# Patient Record
Sex: MALE
[De-identification: names, ages, dates, MRNs, and addresses within clinical notes are randomized per-mention and may not be internally consistent; named-entity substitution may affect disease eponyms.]

---

## 2019-03-23 ENCOUNTER — HOSPITAL ENCOUNTER (INPATIENT)
Dept: HOSPITAL 14 - H.ER | Age: 54
LOS: 9 days | Discharge: HOME | DRG: 430 | End: 2019-04-01
Attending: PSYCHIATRY & NEUROLOGY | Admitting: PSYCHIATRY & NEUROLOGY
Payer: COMMERCIAL

## 2019-03-23 VITALS — BODY MASS INDEX: 29.5 KG/M2

## 2019-03-23 VITALS — OXYGEN SATURATION: 95 %

## 2019-03-23 DIAGNOSIS — J44.9: ICD-10-CM

## 2019-03-23 DIAGNOSIS — F17.210: ICD-10-CM

## 2019-03-23 DIAGNOSIS — Z91.19: ICD-10-CM

## 2019-03-23 DIAGNOSIS — Z59.0: ICD-10-CM

## 2019-03-23 DIAGNOSIS — R45.851: ICD-10-CM

## 2019-03-23 DIAGNOSIS — Z91.14: ICD-10-CM

## 2019-03-23 DIAGNOSIS — G43.909: ICD-10-CM

## 2019-03-23 DIAGNOSIS — F31.63: Primary | ICD-10-CM

## 2019-03-23 DIAGNOSIS — I10: ICD-10-CM

## 2019-03-23 PROCEDURE — GZ56ZZZ INDIVIDUAL PSYCHOTHERAPY, SUPPORTIVE: ICD-10-PCS | Performed by: PSYCHIATRY & NEUROLOGY

## 2019-03-23 PROCEDURE — GZ58ZZZ INDIVIDUAL PSYCHOTHERAPY, COGNITIVE-BEHAVIORAL: ICD-10-PCS | Performed by: PSYCHIATRY & NEUROLOGY

## 2019-03-23 PROCEDURE — GZHZZZZ GROUP PSYCHOTHERAPY: ICD-10-PCS | Performed by: PSYCHIATRY & NEUROLOGY

## 2019-03-23 RX ADMIN — Medication SCH TAB: at 21:05

## 2019-03-23 SDOH — ECONOMIC STABILITY - HOUSING INSECURITY: HOMELESSNESS: Z59.0

## 2019-03-23 NOTE — PCM.PSYCH
Initial Psychiatric Evaluation





- Initial Psychiatric Evaluation


Chief Complaint (in patient's own words): 





was discharged from hospital living in [place] returned to living with friend 

intermittently in hotel room somewhat homeless became depressed having thought 

of harming self 


Patient's Reaction to Hospitalization: 





signed in voluntarily


History of Present Illness and Precipitating Events: 





feeling depressed suicidal thoughts without plan had presented to [facility] er, was recently discharged from [facility], reports hx 

of depression, non adherence with medication, intermittent homelessness, non 

adherence with treatment


Current Medications: 





Active Medications











Generic Name Dose Route Start Last Admin





  Trade Name Freq  PRN Reason Stop Dose Admin


 


Acetaminophen  650 mg  03/23/19 10:21  





  Tylenol 325mg Tab  PO   





  Q4 PRN   





  Pain (4-7)   





     





     





     


 


Al Hydrox/Mg Hydrox/Simethicone  30 ml  03/23/19 10:21  





  Maalox Plus 30 Ml  PO   





  Q4 PRN   





  Dyspepsia   





     





     





     


 


Diphenhydramine HCl  50 mg  03/23/19 10:21  





  Benadryl  IM   





  Q6 PRN   





  Extrapyramidal S/S Unable PO   





     





     





     


 


Diphenhydramine HCl  50 mg  03/23/19 10:34  





  Benadryl  PO   





  HS PRN   





  Sleep   





     





     





     


 


Diphenhydramine HCl  50 mg  03/23/19 10:39  





  Benadryl  PO   





  Q6 PRN   





  Dystonic reaction/ EPS   





     





     





     


 


Haloperidol  5 mg  03/23/19 10:21  





  Haldol  PO   





  Q4 PRN   





  Agitation   





     





     





     


 


Haloperidol Lactate  5 mg  03/23/19 10:21  





  Haldol  IM   





  Q4 PRN   





  Agitation, Unable to Take PO   





     





     





     


 


Lorazepam  2 mg  03/23/19 10:21  





  Ativan  IM   





  Q4 PRN   





  Anxiety/Agitation,Unable PO   





     





     





     


 


Lorazepam  1 mg  03/23/19 10:33  





  Ativan  PO   





  Q8 PRN   





  Anxiety   





     





     





     


 


Magnesium Hydroxide  30 ml  03/23/19 10:21  





  Milk Of Magnesia  PO   





  HS PRN   





  Constipation   





     





     





     














Past Psychiatric History





- Past Psychiatric History


Prior Psychiatric Treatment: inpt opd non adherence


At what hospital: [facility]


Nature of Treatment: inpt opd non follow up 


Explanation of prior treatment: 





both inpt opd and non follow up


History of Abuse: 





deferred


History of ETOH/Drug Use: 





2 pks cigarettes per day


History of Family Illness: 





defers


Pertinent Medical Hx (Current Medical&Sleep Prob, Allergies): 





                                    Allergies











Allergy/AdvReac Type Severity Reaction Status Date / Time


 


No Known Allergies Allergy   Verified 03/23/19 06:56














Review of Systems





- Psychiatric


Psychiatric: Abnormal Sleep Pattern, Anxiety, Depression





Mental Status Examination





- Personal Presentation


Personal Presentation: Looks older than stated age





- Affect


Affect: Constricted





- Motor Activity


Motor Activity: Psychomotor Retardation





- Reliability in Providing Information


Reliability in Providing Information: Fair





- Speech


Speech: Organized





- Formal Thought Process


Formal Thought Process: No Impairment





- Obsessions/Compulsions


Obsessions: No


Compulsions: No





- Cognitive Functions


Orientation: Person, Place, Situation, Time


Sensorium: Alert


Judgement: Imparied, as evidence by: Poor judgement





- Risk


Risk: Suicidal


Additional comments: 





no current plan current contracts safety





- Strength & Assets Inventory


Strength & Assets Inventory: Cooperative (homeless, decreased adherence)





- Limitations


Additional comments: 





alteration in domicile 


non adherence





DSM 5 DX





- DSM 5


DSM 5 Diagnosis: 





major depressive disorder moderate to severe no psychosis


substance use: thc


suicidal ideations





- Recommended/Plan of Treatment


Treatment Recommendations and Plan of Treatment: 





inpt admission per attending


vital signs and clinical observation per protocol and per clinical status


prns per unit protocol


hospitalist consult 


mirtazepine 7.5mg po hs -assess response if no side consider increasing to 15mg 

po hs therapeutic dose


mvi one tablet po daily


discharge planning in progress


Projected ELOS: 5-7 days


Prognosis: guarded 


Discharge Plan and Discharge Criteria: 





safety





- Smoking Cessation


Smoking Cessation Initiated: Yes

## 2019-03-23 NOTE — PCM.BM
<George Espinoza SAV - Last Filed: 19 09:53>





Treatment Plan Problems





- Problems identified on initial assessmt


  ** self harm


Date Initiated: 19


Time Initiated: 07:30


Assessment reference: NA


Status: Active





  ** Suicidal Ideation


Date Initiated: 19


Time Initiated: 07:30


Assessment reference: NA


Status: Active





  ** Medication nonadherence


Date Initiated: 19


Time Initiated: 07:30


Assessment reference: NA


Status: Active





  ** Hopelessness/Helplessness


Date Initiated: 19


Time Initiated: 07:30


Assessment reference: NA


Status: Active





  ** Altered Sleep Patterns


Date Initiated: 19


Time Initiated: 07:30


Assessment reference: NA


Status: Active





Treatment assets and liabiliti


Patient Assests: ADL independent, negotiates basic needs


Patient Liabilities: live alone, financial problems, poor support system, 

substance abuse





- Milieu Protocol


Maintain good personal hygiene: every shift Encourage regular showers, every 

shift Remind patient to perform daily oral care, every shift Assist patient to 

perform ADL's


Maintain personal safety: every shift Educate patient to report safety concerns 

to staff, every shift Monitor environment for contraband/sharps


Medication safety: Monitor for expected outcome, potential side effects: every 

shift, Assess barriers to learning: every shift, Assess readiness for medication

education: every shift





<Tegan Worthington - Last Filed: 19 15:46>





Treatment assets and liabiliti


Patient Assests: adapts well, cooperative, resourceful, self-reliant, ADL 

independent, physically healthy (Pt. reports hx of HTN and asthma. Please see 

H&P.), negotiates basic needs, cognitively intact


Patient Liabilities: live alone ( Pt. reports being homeless x8 months and 

staying with friends and in motels on/off. ), financial problems (Pt. reports 

losing employment shortly after girlfriend death and having had difficulties 

maintaining employment since.), poor support system (Pt. reports poor family 

supports and limited social supports. Pt. reports that  ex-girlfriend 

was pts primary support ( 5 years ago). ), relationship conflicts (Pt. 

denies having communication with family. ), substance abuse (Pt. reports 

significant polysubstance abuse hx (started: age 15 ; crack cocaine, ETOH). Pt. 

reports maintaining sobriety x15 years with assistance from NA. Pt. reports not 

having attended NA for a a while but denies relapse. )





Family Contact


Family involvement: Famliy/SO not involved


Family contact: Patient declines to allow family contact at present





- Goals for Treatment


Patient goals for treatment: Patient to continue stabilization on 3NP through 

medication management and group/supportive therapy to address sxs of depresison 

(mood, energy, motivation, sleep) and eliminate SI. Patient to be encouraged to 

attend groups regularly to promote self-awareness, sobriety, and improve 

insight, compliance, coping skills and self-esteem. Patient to be provided with 

referral for appropriate level of aftercare to reduce risk of future 

hospitalizations and ensure safety in the community.  Pt. identified "not 

feeling this way anymore" as primary tx goal. Pt. expressed wanting to be placed

on medications to address sxs of depression and seek employment.





Discharge/Continuing Care





- Education Needs


Education Needs: Patient Medication, Patient Diagnosis/Disease Process, Patient 

Coping Skills, Patient Community resources, Patient Aftercare Safety Plan





- Discharge


Discharge Criteria: Tolerates medication w/o severe side effects, Free of 

Suicidal thoughts, Normal sleep pattern, Ability to care for self, Reduction of 

target symptoms


Discharge to:: Shelter





- Treatment Team Participation


Patient/Family/SO Statement: 





19 15:47


Pt. attended tx team this morning to discuss progress on 3NP and tx goals. Pt. 

remains disheveled with poor ADLs. Speech: mildly pressured. Pt. reported 

ongoing sxs of depression as exhibited by feelings of hopelessness, poor energy 

and motivation, and sleep disturbances. Pt. reported passive SI without plan or 

intent and is able to contract for safety on 3NP. Pt. made aware of staff 

availability. Pt. requested that he not be started on Lithium secondary to hx of

tremors. Recommended medication regimen discussed at length. Pt. agreeable. 


Discussed with Family/SO: No


Was Patient/Family/SO present at Treatment Team Meeting: Yes





<Trever Steinberg - Last Filed: 19 14:24>





- Diagnosis


(1) Bipolar I disorder with mixed features


Status: Acute   


Interventions: 





19 14:24


pharmacotherapy/ mood stabilizer . depakote

## 2019-03-23 NOTE — ED PDOC
Psych Transfer Clearance





- Clearance Statement


Clearance Statement: 


Vital signs, lab results and transfer papers by Dr Marshall on previous shift.  

Patient clinically stable for psychiatric admission.

## 2019-03-24 LAB
HDLC SERPL-MCNC: 33 MG/DL (ref 30–70)
LDLC SERPL-MCNC: 98 MG/DL (ref 0–129)

## 2019-03-24 RX ADMIN — Medication SCH TAB: at 08:51

## 2019-03-24 NOTE — PCM.PYCHPN
Psychiatric Progress Note





- Psychiatric Progress Note


Patient seen today, length of contact: chart reviewed case discussed with team


Patient Chief Complaint: 


pt reports slept with mirtazepine 7.5mg reports some am somnolence reports this 

"was best sleep he had had in a very long time". admits in past was treated for 

bipolar illness with lithium and prozac did not like it "because it made me 

shake i am  and i need my fingers". reports in past was treated with

depakote but did not like the idea of having to get blood tests and cannot 

"remember if it even worked". denies haven taken trileptal in past".  staff 

report pt has been rx adherent. 


Problems Identified/Issues Discussed: 





alteration in sleep -improved with use of mirtazepine


alteration in mood


Medical Problems: 





both inpt opd and non follow up


Diagnostic Results: 





per psychiatry


per medicine


per nursing 


per social work


per recreational therapy





DSM 5 Symptoms Update: 





improvement in sleep


admits some depression some ups downs mood


Medication Change: Yes (d/cmirtazepine 7.5mg, [id number], trileptal 

150mg po bid )


Medical Record Reviewed: Yes


Consults ordered or reviewed: 





pt seen by hospitalist





Mental Status Examination





- Cognitive Function


Orientation: Person, Place, Situation, Time


Attention: WNL


Concentration: WNL


Association: WNL


Fund of Knowledge: WNL


Decription of patient's judgement and insights: 





impaired





- Mood


Mood: Depressed





- Affect


Affect: Constricted





- Speech


Speech: Soft





- Formal Thought Process


Formal Thought Process: No Impairment





- Homicidal Ideation


Homicidal Ideation: No





Goal/Treatment Plan





- Goal/Treatment Plan


Progress Toward Problem(s) and Goals/Treatment Plan: 





inpt milieu 


will increase mirtazepine to 15mg po hs for depressive symptoms


will start trileptal 150mg po bid for reported vacillations in mood and reported

irritability in mood 


prns per unit protocol


discharge planning in progress


Estimated Date of D/C: 03/29/19





- Smoking Cessation


Smoking Cessation Initiated: Yes

## 2019-03-25 RX ADMIN — Medication SCH TAB: at 09:45

## 2019-03-25 RX ADMIN — DIVALPROEX SODIUM SCH MG: 500 TABLET, DELAYED RELEASE ORAL at 21:07

## 2019-03-25 NOTE — CP.PCM.CON
History of Present Illness





- History of Present Illness


History of Present Illness: 





Attending: Dr Corcoran





Reason for Consult: Management of COPD and other medical matters





The patient was seen and examined in the Psych Unit





HPI: The hx was obtained from the patient and after review of the laboratory and

medical records. He is a 54 years old male with hx of Asthma, HTN and 

depression, who was admitted to the [facility] on 3/23/19 after 

being transferred from the [facility]. He complains of headaches 2-3 times 

a week, no cough, nausea, vomits, Chest pain, palpitation, dysuria nor diarrhea.








PMH: HTN; Asthba; Migraine; Depression





PSH: Denies





SH: Smokes 2PPD No ETOH; No ill; Homdelss





FH: States: No Know family





Allergies: NKDA





Medication: Reviewed





Review of Systems





- Constitutional


Constitutional: Headache.  absent: Anorexia, Chills, Fever





- EENT


Eyes: Requires Corrective Lenses.  absent: Diplopia


Ears: absent: Decreased Hearing, Tinnitus


Nose/Mouth/Throat: absent: Epistaxis, Nasal Congestion, Nasal Discharge, Sinus 

Pain, Sinus Pressure





- Cardiovascular


Cardiovascular: absent: Chest Pain, Edema





- Respiratory


Respiratory: absent: Cough, Dyspnea, Wheezing





- Gastrointestinal


Gastrointestinal: Abdominal Pain.  absent: Constipation, Diarrhea





- Genitourinary


Genitourinary: absent: Dysuria, Hematuria, Urinary Frequency





- Musculoskeletal


Musculoskeletal: absent: Back Pain, Muscle Cramps





- Integumentary


Integumentary: Rash.  absent: Skin Ulcer, Sores, Striae, Swelling





- Neurological


Neurological: absent: Confusion, Focal Weakness, Restless Legs





- Psychiatric


Psychiatric: Depression.  absent: Anxiety





- Endocrine


Endocrine: absent: Palpitations, Polydipsia, Polyphagia, Polyuria





- Hematologic/Lymphatic


Hematologic: absent: Easy Bleeding, Easy Bruising





Past Patient History





- Past Medical History & Family History


Past Medical History?: Yes





- Past Social History


Smoking Status: Heavy Smoker > 10 Cigarettes Daily


Chewing Tobacco Use: No


Cigar Use: No


Alcohol: None


Drugs: Denies


Home Situation {Lives}: Homeless





- CARDIAC


Hx Cardiac Disorders: Yes


Hx Hypertension: Yes





- PULMONARY


Hx Asthma: Yes


Hx Chronic Obstructive Pulmonary Disease (COPD): Yes





- NEUROLOGICAL


Hx Neurological Disorder: Yes


Hx Migraine: Yes





- HEENT


Hx HEENT Problems: No





- RENAL


Hx Chronic Kidney Disease: No





- ENDOCRINE/METABOLIC


Hx Endocrine Disorders: No





- HEMATOLOGICAL/ONCOLOGICAL


Hx Blood Disorders: No





- INTEGUMENTARY


Hx Eczema: Yes (upper and lower extremities)





- MUSCULOSKELETAL/RHEUMATOLOGICAL


Hx Back Pain: Yes





- GASTROINTESTINAL


Hx Gastrointestinal Disorders: No





- GENITOURINARY/GYNECOLOGICAL


Hx Genitourinary Disorders: No


Other/Comment: hx of urinary stint, no longer in place





- PSYCHIATRIC


Hx Anxiety: Yes


Hx Bipolar Disorder: Yes


Hx Depression: Yes


Hx Substance Use: Yes





- SURGICAL HISTORY


Hx Surgeries: No


Other/Comment: hx of urinary stint, no longer in place





- ANESTHESIA


Hx Anesthesia: No





Meds


Allergies/Adverse Reactions: 


                                    Allergies











Allergy/AdvReac Type Severity Reaction Status Date / Time


 


No Known Allergies Allergy   Verified 03/23/19 06:56














- Medications


Medications: 


                               Current Medications





Acetaminophen (Tylenol 325mg Tab)  650 mg PO Q4 PRN


   PRN Reason: Pain (4-7)


Al Hydrox/Mg Hydrox/Simethicone (Maalox Plus 30 Ml)  30 ml PO Q4 PRN


   PRN Reason: Dyspepsia


Diphenhydramine HCl (Benadryl)  50 mg IM Q6 PRN


   PRN Reason: Extrapyramidal S/S Unable PO


Diphenhydramine HCl (Benadryl)  50 mg PO HS PRN


   PRN Reason: Sleep


Diphenhydramine HCl (Benadryl)  50 mg PO Q6 PRN


   PRN Reason: Dystonic reaction/ EPS


Divalproex Sodium (Depakote Dr(*Bid*))  500 mg PO DAILY [facility]


Divalproex Sodium (Depakote Dr(*Bid*))  1,000 mg PO HS [facility]


   Last Admin: 03/25/19 21:07 Dose:  1,000 mg


Haloperidol (Haldol)  5 mg PO Q4 PRN


   PRN Reason: Agitation


Haloperidol Lactate (Haldol)  5 mg IM Q4 PRN


   PRN Reason: Agitation, Unable to Take PO


Lorazepam (Ativan)  2 mg IM Q4 PRN


   PRN Reason: Anxiety/Agitation,Unable PO


Lorazepam (Ativan)  1 mg PO Q8 PRN


   PRN Reason: Anxiety


Magnesium Hydroxide (Milk Of Magnesia)  30 ml PO HS PRN


   PRN Reason: Constipation


Mirtazapine (Remeron)  15 mg PO HS [facility]


   Last Admin: 03/25/19 21:07 Dose:  15 mg


Multivitamins/Minerals (Therapeutic-M Tab)  1 tab PO DAILY [facility]


   Last Admin: 03/25/19 09:45 Dose:  1 tab


Nicotine (Nicoderm Cq)  1 patch TD DAILY [facility]


   Last Admin: 03/25/19 09:45 Dose:  1 patch











Physical Exam





- Constitutional


Appears: No Acute Distress





- Head Exam


Head Exam: ATRAUMATIC, NORMAL INSPECTION, NORMOCEPHALIC





- Eye Exam


Eye Exam: EOMI, Normal appearance


Pupil Exam: NORMAL ACCOMODATION, PERRL





- ENT Exam


ENT Exam: Mucous Membranes Moist, Normal Exam, Normal External Ear Exam





- Neck Exam


Neck exam: Positive for: Full Rom, Normal Inspection.  Negative for: 

Lymphadenopathy, Tenderness





- Respiratory Exam


Respiratory Exam: Clear to Auscultation Bilateral.  absent: Rales, Rhonchi, 

Wheezes





- Cardiovascular Exam


Cardiovascular Exam: REGULAR RHYTHM, RRR, +S1, +S2





- GI/Abdominal Exam


GI & Abdominal Exam: Normal Bowel Sounds, Soft.  absent: Mass, Organomegaly, 

Tenderness





- Rectal Exam


Rectal Exam: Deferred





- Extremities Exam


Extremities exam: Positive for: full ROM, normal inspection.  Negative for: calf

tenderness, joint swelling, pedal edema





- Back Exam


Back exam: NORMAL INSPECTION.  absent: CVA tenderness (L), CVA tenderness (R)





- Neurological Exam


Neurological exam: Alert, CN II-XII Intact, Oriented x3, Reflexes Normal





- Psychiatric Exam


Psychiatric exam: Normal Affect, Normal Mood





- Skin


Skin Exam: Dry, Intact, Normal Color, Warm





Results





- Vital Signs


Recent Vital Signs: 


                                Last Vital Signs











Temp  97.9 F   03/25/19 18:15


 


Pulse  88   03/25/19 18:15


 


Resp  18   03/25/19 18:15


 


BP  118/89   03/25/19 18:15


 


Pulse Ox  95   03/23/19 06:53














- Labs


Labs: 


                         Laboratory Results - last 24 hr











  03/24/19





  07:55


 


Hemoglobin A1c  5.4














Assessment & Plan





- Assessment and Plan (Free Text)


Plan: 





54 years old male with hx of Asthma, HTN and depression, who was admitted to the

[facility] on 3/23/19 after being transferred from the [facility]. He complains of headaches 2-3 times a week, no cough, nausea, vomits, 

Chest pain, palpitation, dysuria nor diarrhea.





#. Depression


- Consult Psychiatry


- Psychiatric management


- Continue medications.





#. HTN


- Follow blood pressure





#. Nicotine Addiction


- apply Nicotine patch daily





#. Migraine


- Treat Migraine PRN





Rodney Ty MD





- Date & Time


Date: 03/25/19


Time: 23:30

## 2019-03-25 NOTE — PCM.PYCHPN
Psychiatric Progress Note





- Psychiatric Progress Note


Patient seen today, length of contact: chart reviewed case discussed with team


Patient Chief Complaint: 





I have episodes of anger I could not control


Problems Identified/Issues Discussed: 





pt evaluated , presenting with anxious mood and irritable effect, reporting 

episodes of anger and poor impulse control, previously diagnosed with bipolar 

disorder, discussed adding depakote, continues to report poor sleep with early 

insomnia, denied perceptual disturbances, denied active thoughts of self harm on

the unit 


DSM 5 Symptoms Update: 





bipolar I disorder mixed severe 


Medication Change: Yes (start depakote )


Medical Record Reviewed: Yes





Mental Status Examination





- Cognitive Function


Orientation: Person, Place, Situation, Time


Attention: WNL


Concentration: WNL


Association: WNL


Fund of Knowledge: WNL





- Mood


Mood: Depressed





- Affect


Affect: Constricted


Additional comments: 





irritable 





- Speech


Speech: Appropriate, Soft





- Formal Thought Process


Formal Thought Process: Circumstantial





- Suicidal Ideation


Suicidal Ideation: No





- Homicidal Ideation


Homicidal Ideation: No





Goal/Treatment Plan





- Goal/Treatment Plan


Need for Continued Stay: Severe depression anxiety, Discharge may exacerbated 

symptoms


Progress Toward Problem(s) and Goals/Treatment Plan: 


remeron 15mg qhs


start depakote 500mg daily, 1000mg qhs


cbt group therapy





Estimated Date of D/C: 03/29/19

## 2019-03-26 RX ADMIN — Medication SCH TAB: at 09:14

## 2019-03-26 RX ADMIN — DIVALPROEX SODIUM SCH MG: 500 TABLET, DELAYED RELEASE ORAL at 09:15

## 2019-03-26 RX ADMIN — DIVALPROEX SODIUM SCH MG: 500 TABLET, DELAYED RELEASE ORAL at 21:08

## 2019-03-26 NOTE — PCM.PYCHPN
Psychiatric Progress Note





- Psychiatric Progress Note


Patient seen today, length of contact: chart reviewed case discussed with team


Patient Chief Complaint: 





I get reminded with tings that make me angry 


Problems Identified/Issues Discussed: 





pt evaluated , presenting with anxious mood and irritable effect, observed 

pacing on the unit with explosive behaviour , needed frequent redirection, pt 

refusing to share the reason of his anger stated he has been trigereed to 

remember unpleasent events in his life, discussed with pt the need to have anger

management treatment on discharge, educated pt about possible healthy coping 

skills as he continues to act out, no reported side effects of medications, 

encouraged pt to attend groups 


DSM 5 Symptoms Update: 





bipolar I disorder mxed severe 


Medication Change: No (start depakote )


Medical Record Reviewed: Yes





Mental Status Examination





- Cognitive Function


Orientation: Person, Place, Situation, Time


Attention: WNL


Concentration: WNL


Association: WNL


Fund of Knowledge: WNL





- Mood


Mood: Depressed





- Affect


Affect: Constricted


Additional comments: 





angry irritable 





- Speech


Speech: Loud





- Formal Thought Process


Formal Thought Process: Circumstantial


Psychotic Thoughts and Behaviors: 





pt denied perceptual disturbances , non elicited 





- Suicidal Ideation


Suicidal Ideation: No





- Homicidal Ideation


Homicidal Ideation: No





Goal/Treatment Plan





- Goal/Treatment Plan


Need for Continued Stay: Severe depression anxiety, Discharge may exacerbated 

symptoms


Progress Toward Problem(s) and Goals/Treatment Plan: 


remeron 15mg qhs


start depakote 500mg daily, 1000mg qhs/ follow up on depakote level increase as 

needed 


cbt group therapy





Estimated Date of D/C: 03/29/19

## 2019-03-27 RX ADMIN — DIVALPROEX SODIUM SCH MG: 500 TABLET, DELAYED RELEASE ORAL at 21:01

## 2019-03-27 RX ADMIN — DIVALPROEX SODIUM SCH MG: 500 TABLET, DELAYED RELEASE ORAL at 09:08

## 2019-03-27 RX ADMIN — Medication SCH TAB: at 09:08

## 2019-03-27 NOTE — PCM.PYCHPN
Psychiatric Progress Note





- Psychiatric Progress Note


Patient seen today, length of contact: chart reviewed case discussed with team


Patient Chief Complaint: 





I would like to go to [facility]


Problems Identified/Issues Discussed: 





pt evaluated , continues to be guarded evasive , anxious mood , irritable 

affect, discussed with pt increasing dose of depakote after checking on depakote

level tomorrow AM, encouraged pt to attend groups, pt continues to report poor 

impulse control 


denied perceptual disturbances, denied suicidal ideation


DSM 5 Symptoms Update: 





bipolar I disorder mixed 


Medication Change: No


Medical Record Reviewed: Yes





Mental Status Examination





- Cognitive Function


Orientation: Person, Place, Situation, Time


Attention: WNL


Concentration: WNL


Association: WNL


Fund of Knowledge: WNL





- Mood


Mood: Depressed





- Affect


Affect: Constricted





- Speech


Speech: Loud





- Formal Thought Process


Formal Thought Process: Circumstantial


Psychotic Thoughts and Behaviors: 





pt denied perceptual disturbances , non elicited 





- Suicidal Ideation


Suicidal Ideation: No





- Homicidal Ideation


Homicidal Ideation: No





Goal/Treatment Plan





- Goal/Treatment Plan


Need for Continued Stay: Severe depression anxiety, Discharge may exacerbated 

symptoms


Progress Toward Problem(s) and Goals/Treatment Plan: 


remeron 15mg qhs


start depakote 500mg daily, 1000mg qhs/ follow up on depakote level increase as 

needed 


cbt group therapy





Estimated Date of D/C: 03/29/19

## 2019-03-28 RX ADMIN — DIVALPROEX SODIUM SCH MG: 500 TABLET, DELAYED RELEASE ORAL at 09:18

## 2019-03-28 RX ADMIN — Medication SCH TAB: at 09:18

## 2019-03-28 RX ADMIN — DIVALPROEX SODIUM SCH MG: 500 TABLET, DELAYED RELEASE ORAL at 21:02

## 2019-03-29 RX ADMIN — DIVALPROEX SODIUM SCH MG: 500 TABLET, DELAYED RELEASE ORAL at 09:18

## 2019-03-29 RX ADMIN — Medication SCH TAB: at 09:18

## 2019-03-29 RX ADMIN — DIVALPROEX SODIUM SCH MG: 500 TABLET, DELAYED RELEASE ORAL at 21:09

## 2019-03-29 NOTE — PCM.[email or web address]
Psychiatric Progress Note





- Psychiatric Progress Note


Patient seen today, length of contact: chart reviewed case discussed with team


Patient Chief Complaint: 





I am alright 


Problems Identified/Issues Discussed: 





pt evaluated , observed in day room, interacting with other peers, reported 

improved sleep, no reported side effects of medications, no noted changes in 

appetite 


denied perceptual disturbances, denied any current  suicidal or homicidal  

ideation


DSM 5 Symptoms Update: 





bipolar I disorder mixed 


Medication Change: No


Medical Record Reviewed: Yes





Mental Status Examination





- Cognitive Function


Orientation: Person, Place, Situation, Time


Attention: WNL


Concentration: WNL


Association: WNL


Fund of Knowledge: WNL





- Mood


Mood: Depressed





- Affect


Affect: Constricted





- Speech


Speech: Loud





- Formal Thought Process


Formal Thought Process: Circumstantial


Psychotic Thoughts and Behaviors: 





pt denied perceptual disturbances , non elicited 





- Suicidal Ideation


Suicidal Ideation: No





- Homicidal Ideation


Homicidal Ideation: No





Goal/Treatment Plan





- Goal/Treatment Plan


Need for Continued Stay: Severe depression anxiety, Discharge may exacerbated 

symptoms


Progress Toward Problem(s) and Goals/Treatment Plan: 


remeron 15mg qhs


start depakote 500mg daily, 1000mg qhs/  depakote level noted 79


cbt, group and supportive therapy  


Estimated Date of D/C: 04/01/19

## 2019-03-30 RX ADMIN — DIVALPROEX SODIUM SCH MG: 500 TABLET, DELAYED RELEASE ORAL at 09:03

## 2019-03-30 RX ADMIN — Medication SCH TAB: at 09:03

## 2019-03-30 RX ADMIN — DIVALPROEX SODIUM SCH MG: 500 TABLET, DELAYED RELEASE ORAL at 21:23

## 2019-03-30 NOTE — PCM.[email or web address]
Psychiatric Progress Note





- Psychiatric Progress Note


Patient seen today, length of contact: chart reviewed case discussed with team


Patient Chief Complaint: 





I am fine


Problems Identified/Issues Discussed: 





pt evaluated , seen in bed , reported improved sleep, no reported side effects 

of medications, no noted changes in appetite 


denied perceptual disturbances, denied any current  suicidal or homicidal  

ideation


DSM 5 Symptoms Update: 





bipolar I disorder 


Medication Change: No


Medical Record Reviewed: Yes





Mental Status Examination





- Cognitive Function


Orientation: Person, Place, Situation, Time


Attention: WNL


Concentration: WNL


Association: WNL


Fund of Knowledge: WNL





- Mood


Mood: Depressed





- Affect


Affect: Constricted





- Speech


Speech: Loud





- Formal Thought Process


Formal Thought Process: Circumstantial


Psychotic Thoughts and Behaviors: 





pt denied perceptual disturbances , non elicited 





- Suicidal Ideation


Suicidal Ideation: No





- Homicidal Ideation


Homicidal Ideation: No





Goal/Treatment Plan





- Goal/Treatment Plan


Need for Continued Stay: Severe depression anxiety, Discharge may exacerbated 

symptoms


Progress Toward Problem(s) and Goals/Treatment Plan: 


remeron 15mg qhs


 depakote 500mg daily, 1000mg qhs/  depakote level noted 79


cbt, group and supportive therapy  


Estimated Date of D/C: 04/01/19

## 2019-03-31 VITALS — RESPIRATION RATE: 18 BRPM

## 2019-03-31 RX ADMIN — DIVALPROEX SODIUM SCH MG: 500 TABLET, DELAYED RELEASE ORAL at 21:05

## 2019-03-31 RX ADMIN — Medication SCH TAB: at 08:39

## 2019-03-31 RX ADMIN — DIVALPROEX SODIUM SCH MG: 500 TABLET, DELAYED RELEASE ORAL at 08:40

## 2019-03-31 NOTE — PCM.[email or web address]
Psychiatric Progress Note





- Psychiatric Progress Note


Patient seen today, length of contact: chart reviewed case discussed with team


Patient Chief Complaint: 





I am anxious about leaving 


Problems Identified/Issues Discussed: 





pt evaluated , seen in bed ,stated feeling anxious about facing the outside on 

discharge  reported improved sleep, no reported side effects of medications, no 

noted changes in appetite 


denied perceptual disturbances, denied any current  suicidal or homicidal  

ideation


DSM 5 Symptoms Update: 





bipolar I disorder 


Medication Change: No


Medical Record Reviewed: Yes





Mental Status Examination





- Cognitive Function


Orientation: Person, Place, Situation, Time


Attention: WNL


Concentration: WNL


Association: WNL


Fund of Knowledge: WNL





- Mood


Mood: Depressed





- Affect


Affect: Constricted





- Speech


Speech: Loud





- Formal Thought Process


Formal Thought Process: Circumstantial


Psychotic Thoughts and Behaviors: 





pt denied perceptual disturbances , non elicited 





- Suicidal Ideation


Suicidal Ideation: No





- Homicidal Ideation


Homicidal Ideation: No





Goal/Treatment Plan





- Goal/Treatment Plan


Need for Continued Stay: Severe depression anxiety, Discharge may exacerbated 

symptoms


Progress Toward Problem(s) and Goals/Treatment Plan: 


remeron 15mg qhs


 depakote 500mg daily, 1000mg qhs/  depakote level noted 79


cbt, group and supportive therapy  


Estimated Date of D/C: 04/01/19

## 2019-04-01 VITALS — HEART RATE: 82 BPM | TEMPERATURE: 96.8 F

## 2019-04-01 VITALS — SYSTOLIC BLOOD PRESSURE: 130 MMHG | DIASTOLIC BLOOD PRESSURE: 82 MMHG

## 2019-04-01 RX ADMIN — DIVALPROEX SODIUM SCH MG: 500 TABLET, DELAYED RELEASE ORAL at 09:13

## 2019-04-01 NOTE — PCM.PYCHDC
Mental Status Examination





- Mental Status Examination


Orientation: Person, Place, Situation


Memory: Intact


Mood: Neutral


Affect: Broad


Speech: Appropriate


Attention: WNL


Concentration: WNL


Association: WNL


Fund of Knowledge: WNL


Formal Thought Process: No Impairment


Description of patient's judgement and insight: 





partial insight fair judgment 


Psychotic Thoughts and Behaviors: 





pt denied perceptual disturbances , non elicited 


Suicidal Ideation: No


Current Homicidal Ideation?: No





Discharge Summary





- Discharge Note


Reason for Hospitalization: 





as per admission note


pt is 54ys old male  


feeling depressed suicidal thoughts without plan had presented to [facility], was recently discharged from [facility], reports hx 

of depression, non adherence with medication, intermittent homelessness, non 

adherence with treatment


Psychiatric History (includes Medical, Family, Personal Hx): inpt opd non follow

up 


Consultations:: List each consultation separately and include:  1. Reason for 

request.  2. Findings.  3. Follow-up


Summary of Hospital Course include:: 1. Description of specific treatment plan 

utilized for patients during their course of treatmen.  2. Summarize the time-

course for resolution of acute symptoms and/or regressed behaviors.  3. Describe

issues identified and worked on during hospitalization.  4. Describe medication 

utilized.  5. Describe medical problems identified and treated.  6. Reassessment

of suicide risk


Summary of Hospital Course: 





pt on admission was started on remeron for depression


also started on depakote for mood liability and CBT provided for coping skills 

needed for anger management


pt was compliant with treatment attended groups, no reported side effects of 

medications, depakote level was 79


on discharge pt mental status was stable , pt denied any current suicidal or 

homicidal ideation denied perceptual disturbances, 


follow was to be arranged by  at [facility] outpatient 





- Diagnosis


(1) Bipolar I disorder with mixed features


Current Visit: Yes   Status: Acute   





- Final Diagnosis (DSM 5)


Condition upon Discharge: STABLE


DSM 5: 





bipolar I disorder MRE mixed severe without psychotic features 


Disposition: HOME/ ROUTINE


Follow-up Treatment Plan: 


remeron 15mg qhs


 depakote 500mg daily, 1000mg qhs/  depakote level noted 79


cbt, group and supportive therapy  


Prescriptions/Medication Reconciliation: 


Divalproex [Depakote DR(*BID*)] 500 mg PO DAILY 30 Days #30 tcp


Divalproex [Depakote DR(*BID*)] 1,000 mg PO HS 30 Days #60 tcp


Mirtazapine [Remeron] 15 mg PO HS 30 Days #30 tab


Multimineral/Multivitamin [Therapeutic-M Tab] 1 tab PO DAILY 30 Days #30 tab


Nicotine 21 mg/24 hr [Nicoderm Cq] 1 patch TD DAILY 30 Days #30 patch





- Smoking Cessation


Smoking Cessation Medication prescribed: Yes





- Antipsychotic Medications


Pt discharged on 2 or more routine antipsychotic medications: No

## 2022-07-17 NOTE — PCM.PYCHPN
Psychiatric Progress Note





- Psychiatric Progress Note


Patient seen today, length of contact: chart reviewed case discussed with team


Patient Chief Complaint: 





I still get angry


Problems Identified/Issues Discussed: 





pt evaluated , continues to report feeling anxious and irritable, related that 

to feeling worried about being homeless on discharge, CBT provided, educated pt 

about coping skills with anger, as pt continues to exhibit poor impulse control,

discussed increasing depakote after checking on depakote level, no reported side

effects, pt denied any current changes in sleep or appetite 


denied perceptual disturbances, denied suicidal ideation


DSM 5 Symptoms Update: 





bipolar I disorder mixed 


Medication Change: No


Medical Record Reviewed: Yes





Mental Status Examination





- Cognitive Function


Orientation: Person, Place, Situation, Time


Attention: WNL


Concentration: WNL


Association: WNL


Fund of Knowledge: WNL





- Mood


Mood: Depressed





- Affect


Affect: Constricted





- Speech


Speech: Loud





- Formal Thought Process


Formal Thought Process: Circumstantial


Psychotic Thoughts and Behaviors: 





pt denied perceptual disturbances , non elicited 





- Suicidal Ideation


Suicidal Ideation: No





- Homicidal Ideation


Homicidal Ideation: No





Goal/Treatment Plan





- Goal/Treatment Plan


Need for Continued Stay: Severe depression anxiety, Discharge may exacerbated 

symptoms


Progress Toward Problem(s) and Goals/Treatment Plan: 


remeron 15mg qhs


start depakote 500mg daily, 1000mg qhs/  depakote level noted 79


cbt, group and supportive therapy  


Estimated Date of D/C: 03/29/19 show